# Patient Record
Sex: FEMALE | Race: BLACK OR AFRICAN AMERICAN | Employment: UNEMPLOYED | URBAN - METROPOLITAN AREA
[De-identification: names, ages, dates, MRNs, and addresses within clinical notes are randomized per-mention and may not be internally consistent; named-entity substitution may affect disease eponyms.]

---

## 2021-04-16 ENCOUNTER — HOSPITAL ENCOUNTER (EMERGENCY)
Age: 31
Discharge: HOME | End: 2021-04-16
Attending: EMERGENCY MEDICINE
Payer: MEDICAID

## 2021-04-16 ENCOUNTER — HOSPITAL ENCOUNTER (EMERGENCY)
Dept: ULTRASOUND IMAGING | Age: 31
Discharge: HOME | End: 2021-04-16
Attending: PHYSICIAN ASSISTANT
Payer: MEDICAID

## 2021-04-16 ENCOUNTER — APPOINTMENT (OUTPATIENT)
Dept: CT IMAGING | Age: 31
End: 2021-04-16
Attending: PHYSICIAN ASSISTANT
Payer: MEDICAID

## 2021-04-16 VITALS
HEART RATE: 90 BPM | DIASTOLIC BLOOD PRESSURE: 75 MMHG | TEMPERATURE: 98.7 F | BODY MASS INDEX: 23.74 KG/M2 | SYSTOLIC BLOOD PRESSURE: 122 MMHG | RESPIRATION RATE: 16 BRPM | OXYGEN SATURATION: 96 % | HEIGHT: 63 IN | WEIGHT: 134 LBS

## 2021-04-16 DIAGNOSIS — K76.0 HEPATIC STEATOSIS: ICD-10-CM

## 2021-04-16 DIAGNOSIS — E87.6 HYPOKALEMIA: ICD-10-CM

## 2021-04-16 DIAGNOSIS — N39.0 URINARY TRACT INFECTION WITH HEMATURIA, SITE UNSPECIFIED: ICD-10-CM

## 2021-04-16 DIAGNOSIS — K86.89 PANCREATIC MASS: Primary | ICD-10-CM

## 2021-04-16 DIAGNOSIS — R31.9 URINARY TRACT INFECTION WITH HEMATURIA, SITE UNSPECIFIED: ICD-10-CM

## 2021-04-16 DIAGNOSIS — E87.1 HYPONATREMIA: ICD-10-CM

## 2021-04-16 LAB
ALBUMIN SERPL-MCNC: 3.4 G/DL (ref 3.4–5)
ALBUMIN/GLOB SERPL: 0.8 {RATIO} (ref 0.8–1.7)
ALP SERPL-CCNC: 364 U/L (ref 45–117)
ALT SERPL-CCNC: 78 U/L (ref 13–56)
AMMONIA PLAS-SCNC: 32 UMOL/L (ref 11–32)
AMPHET UR QL SCN: NEGATIVE
ANION GAP SERPL CALC-SCNC: 10 MMOL/L (ref 3–18)
ANION GAP SERPL CALC-SCNC: 7 MMOL/L (ref 3–18)
APAP SERPL-MCNC: <2 UG/ML (ref 10–30)
APPEARANCE UR: ABNORMAL
APTT PPP: 33.1 SEC (ref 23–36.4)
AST SERPL-CCNC: 172 U/L (ref 10–38)
BACTERIA URNS QL MICRO: ABNORMAL /HPF
BARBITURATES UR QL SCN: NEGATIVE
BASOPHILS # BLD: 0 K/UL (ref 0–0.1)
BASOPHILS NFR BLD: 0 % (ref 0–2)
BENZODIAZ UR QL: NEGATIVE
BILIRUB SERPL-MCNC: 5.4 MG/DL (ref 0.2–1)
BILIRUB UR QL: ABNORMAL
BUN SERPL-MCNC: 5 MG/DL (ref 7–18)
BUN SERPL-MCNC: 6 MG/DL (ref 7–18)
BUN/CREAT SERPL: 11 (ref 12–20)
BUN/CREAT SERPL: 12 (ref 12–20)
CALCIUM SERPL-MCNC: 7.5 MG/DL (ref 8.5–10.1)
CALCIUM SERPL-MCNC: 8.6 MG/DL (ref 8.5–10.1)
CANNABINOIDS UR QL SCN: NEGATIVE
CAOX CRY URNS QL MICRO: ABNORMAL
CHLORIDE SERPL-SCNC: 87 MMOL/L (ref 100–111)
CHLORIDE SERPL-SCNC: 96 MMOL/L (ref 100–111)
CK MB CFR SERPL CALC: NORMAL % (ref 0–4)
CK MB SERPL-MCNC: <1 NG/ML (ref 5–25)
CK SERPL-CCNC: 56 U/L (ref 26–192)
CO2 SERPL-SCNC: 29 MMOL/L (ref 21–32)
CO2 SERPL-SCNC: 32 MMOL/L (ref 21–32)
COCAINE UR QL SCN: NEGATIVE
COLOR UR: ABNORMAL
CREAT SERPL-MCNC: 0.45 MG/DL (ref 0.6–1.3)
CREAT SERPL-MCNC: 0.51 MG/DL (ref 0.6–1.3)
DIFFERENTIAL METHOD BLD: ABNORMAL
EOSINOPHIL # BLD: 0 K/UL (ref 0–0.4)
EOSINOPHIL NFR BLD: 0 % (ref 0–5)
EPITH CASTS URNS QL MICRO: ABNORMAL /LPF (ref 0–5)
ERYTHROCYTE [DISTWIDTH] IN BLOOD BY AUTOMATED COUNT: 12.7 % (ref 11.6–14.5)
ETHANOL SERPL-MCNC: <3 MG/DL (ref 0–3)
GLOBULIN SER CALC-MCNC: 4.1 G/DL (ref 2–4)
GLUCOSE SERPL-MCNC: 100 MG/DL (ref 74–99)
GLUCOSE SERPL-MCNC: 113 MG/DL (ref 74–99)
GLUCOSE UR STRIP.AUTO-MCNC: NEGATIVE MG/DL
HCG UR QL: NEGATIVE
HCT VFR BLD AUTO: 38.1 % (ref 35–45)
HDSCOM,HDSCOM: NORMAL
HGB BLD-MCNC: 13.3 G/DL (ref 12–16)
HGB UR QL STRIP: ABNORMAL
INR PPP: 1.1 (ref 0.8–1.2)
KETONES UR QL STRIP.AUTO: >160 MG/DL
LEUKOCYTE ESTERASE UR QL STRIP.AUTO: ABNORMAL
LIPASE SERPL-CCNC: 88 U/L (ref 73–393)
LYMPHOCYTES # BLD: 0.7 K/UL (ref 0.9–3.6)
LYMPHOCYTES NFR BLD: 7 % (ref 21–52)
MAGNESIUM SERPL-MCNC: 1.6 MG/DL (ref 1.6–2.6)
MCH RBC QN AUTO: 35.4 PG (ref 24–34)
MCHC RBC AUTO-ENTMCNC: 34.9 G/DL (ref 31–37)
MCV RBC AUTO: 101.3 FL (ref 74–97)
METHADONE UR QL: NEGATIVE
MONOCYTES # BLD: 0.8 K/UL (ref 0.05–1.2)
MONOCYTES NFR BLD: 9 % (ref 3–10)
MUCOUS THREADS URNS QL MICRO: ABNORMAL /LPF
NEUTS SEG # BLD: 7.7 K/UL (ref 1.8–8)
NEUTS SEG NFR BLD: 83 % (ref 40–73)
NITRITE UR QL STRIP.AUTO: POSITIVE
OPIATES UR QL: NEGATIVE
PCP UR QL: NEGATIVE
PH UR STRIP: 6 [PH] (ref 5–8)
PLATELET # BLD AUTO: 70 K/UL (ref 135–420)
PMV BLD AUTO: 11.3 FL (ref 9.2–11.8)
POTASSIUM SERPL-SCNC: 2.5 MMOL/L (ref 3.5–5.5)
POTASSIUM SERPL-SCNC: 3 MMOL/L (ref 3.5–5.5)
PROT SERPL-MCNC: 7.5 G/DL (ref 6.4–8.2)
PROT UR STRIP-MCNC: 30 MG/DL
PROTHROMBIN TIME: 14 SEC (ref 11.5–15.2)
RBC # BLD AUTO: 3.76 M/UL (ref 4.2–5.3)
RBC #/AREA URNS HPF: ABNORMAL /HPF (ref 0–5)
SODIUM SERPL-SCNC: 129 MMOL/L (ref 136–145)
SODIUM SERPL-SCNC: 132 MMOL/L (ref 136–145)
SP GR UR REFRACTOMETRY: 1.02 (ref 1–1.03)
TROPONIN I SERPL-MCNC: <0.02 NG/ML (ref 0–0.04)
UROBILINOGEN UR QL STRIP.AUTO: 2 EU/DL (ref 0.2–1)
WBC # BLD AUTO: 9.2 K/UL (ref 4.6–13.2)
WBC URNS QL MICRO: ABNORMAL /HPF (ref 0–5)

## 2021-04-16 PROCEDURE — 96365 THER/PROPH/DIAG IV INF INIT: CPT

## 2021-04-16 PROCEDURE — 80143 DRUG ASSAY ACETAMINOPHEN: CPT

## 2021-04-16 PROCEDURE — 80307 DRUG TEST PRSMV CHEM ANLYZR: CPT

## 2021-04-16 PROCEDURE — 96367 TX/PROPH/DG ADDL SEQ IV INF: CPT

## 2021-04-16 PROCEDURE — 85610 PROTHROMBIN TIME: CPT

## 2021-04-16 PROCEDURE — 83690 ASSAY OF LIPASE: CPT

## 2021-04-16 PROCEDURE — 81001 URINALYSIS AUTO W/SCOPE: CPT

## 2021-04-16 PROCEDURE — 96375 TX/PRO/DX INJ NEW DRUG ADDON: CPT

## 2021-04-16 PROCEDURE — 74177 CT ABD & PELVIS W/CONTRAST: CPT

## 2021-04-16 PROCEDURE — 81025 URINE PREGNANCY TEST: CPT

## 2021-04-16 PROCEDURE — 87086 URINE CULTURE/COLONY COUNT: CPT

## 2021-04-16 PROCEDURE — 74011250636 HC RX REV CODE- 250/636: Performed by: PHYSICIAN ASSISTANT

## 2021-04-16 PROCEDURE — 96366 THER/PROPH/DIAG IV INF ADDON: CPT

## 2021-04-16 PROCEDURE — 85730 THROMBOPLASTIN TIME PARTIAL: CPT

## 2021-04-16 PROCEDURE — 93005 ELECTROCARDIOGRAM TRACING: CPT

## 2021-04-16 PROCEDURE — 82553 CREATINE MB FRACTION: CPT

## 2021-04-16 PROCEDURE — 82077 ASSAY SPEC XCP UR&BREATH IA: CPT

## 2021-04-16 PROCEDURE — 74011000250 HC RX REV CODE- 250: Performed by: PHYSICIAN ASSISTANT

## 2021-04-16 PROCEDURE — 96368 THER/DIAG CONCURRENT INF: CPT

## 2021-04-16 PROCEDURE — 85025 COMPLETE CBC W/AUTO DIFF WBC: CPT

## 2021-04-16 PROCEDURE — 99285 EMERGENCY DEPT VISIT HI MDM: CPT

## 2021-04-16 PROCEDURE — 74011000636 HC RX REV CODE- 636: Performed by: EMERGENCY MEDICINE

## 2021-04-16 PROCEDURE — 87186 SC STD MICRODIL/AGAR DIL: CPT

## 2021-04-16 PROCEDURE — 76705 ECHO EXAM OF ABDOMEN: CPT

## 2021-04-16 PROCEDURE — 80053 COMPREHEN METABOLIC PANEL: CPT

## 2021-04-16 PROCEDURE — 82140 ASSAY OF AMMONIA: CPT

## 2021-04-16 PROCEDURE — 87077 CULTURE AEROBIC IDENTIFY: CPT

## 2021-04-16 PROCEDURE — 83735 ASSAY OF MAGNESIUM: CPT

## 2021-04-16 PROCEDURE — 74011250637 HC RX REV CODE- 250/637: Performed by: PHYSICIAN ASSISTANT

## 2021-04-16 RX ORDER — POTASSIUM CHLORIDE 7.45 MG/ML
10 INJECTION INTRAVENOUS
Status: COMPLETED | OUTPATIENT
Start: 2021-04-16 | End: 2021-04-16

## 2021-04-16 RX ORDER — IBUPROFEN 200 MG
1 TABLET ORAL DAILY
Status: DISCONTINUED | OUTPATIENT
Start: 2021-04-17 | End: 2021-04-17 | Stop reason: HOSPADM

## 2021-04-16 RX ORDER — MORPHINE SULFATE 4 MG/ML
4 INJECTION INTRAVENOUS
Status: DISCONTINUED | OUTPATIENT
Start: 2021-04-16 | End: 2021-04-16

## 2021-04-16 RX ORDER — OMEPRAZOLE 40 MG/1
40 CAPSULE, DELAYED RELEASE ORAL DAILY
Qty: 30 CAP | Refills: 0 | Status: SHIPPED | OUTPATIENT
Start: 2021-04-16

## 2021-04-16 RX ORDER — OXYCODONE HYDROCHLORIDE 5 MG/1
10 TABLET ORAL
Status: COMPLETED | OUTPATIENT
Start: 2021-04-16 | End: 2021-04-16

## 2021-04-16 RX ORDER — CEPHALEXIN 250 MG/1
250 CAPSULE ORAL 4 TIMES DAILY
Qty: 28 CAP | Refills: 0 | Status: SHIPPED | OUTPATIENT
Start: 2021-04-16 | End: 2021-04-23

## 2021-04-16 RX ORDER — ONDANSETRON 2 MG/ML
4 INJECTION INTRAMUSCULAR; INTRAVENOUS
Status: COMPLETED | OUTPATIENT
Start: 2021-04-16 | End: 2021-04-16

## 2021-04-16 RX ORDER — KETOROLAC TROMETHAMINE 30 MG/ML
30 INJECTION, SOLUTION INTRAMUSCULAR; INTRAVENOUS
Status: COMPLETED | OUTPATIENT
Start: 2021-04-16 | End: 2021-04-16

## 2021-04-16 RX ORDER — PANTOPRAZOLE SODIUM 40 MG/10ML
40 INJECTION, POWDER, LYOPHILIZED, FOR SOLUTION INTRAVENOUS
Status: DISCONTINUED | OUTPATIENT
Start: 2021-04-16 | End: 2021-04-17 | Stop reason: HOSPADM

## 2021-04-16 RX ORDER — LORAZEPAM 2 MG/ML
1 INJECTION INTRAMUSCULAR ONCE
Status: COMPLETED | OUTPATIENT
Start: 2021-04-16 | End: 2021-04-16

## 2021-04-16 RX ADMIN — POTASSIUM CHLORIDE 10 MEQ: 7.46 INJECTION, SOLUTION INTRAVENOUS at 19:09

## 2021-04-16 RX ADMIN — POTASSIUM CHLORIDE 10 MEQ: 7.46 INJECTION, SOLUTION INTRAVENOUS at 17:52

## 2021-04-16 RX ADMIN — OXYCODONE 10 MG: 5 TABLET ORAL at 17:24

## 2021-04-16 RX ADMIN — IOPAMIDOL 100 ML: 612 INJECTION, SOLUTION INTRAVENOUS at 17:28

## 2021-04-16 RX ADMIN — POTASSIUM BICARBONATE 40 MEQ: 782 TABLET, EFFERVESCENT ORAL at 20:04

## 2021-04-16 RX ADMIN — CEFTRIAXONE 1 G: 1 INJECTION, POWDER, FOR SOLUTION INTRAMUSCULAR; INTRAVENOUS at 20:02

## 2021-04-16 RX ADMIN — KETOROLAC TROMETHAMINE 30 MG: 30 INJECTION, SOLUTION INTRAMUSCULAR at 18:52

## 2021-04-16 RX ADMIN — FOLIC ACID: 5 INJECTION, SOLUTION INTRAMUSCULAR; INTRAVENOUS; SUBCUTANEOUS at 18:37

## 2021-04-16 RX ADMIN — LORAZEPAM 1 MG: 2 INJECTION INTRAMUSCULAR; INTRAVENOUS at 20:02

## 2021-04-16 RX ADMIN — SODIUM CHLORIDE 1000 ML: 900 INJECTION, SOLUTION INTRAVENOUS at 17:52

## 2021-04-16 RX ADMIN — ONDANSETRON 4 MG: 2 INJECTION INTRAMUSCULAR; INTRAVENOUS at 17:16

## 2021-04-16 NOTE — ED TRIAGE NOTES
Patient with mid abd pain that started x3 days ago  PMHx pancreatis and cyst on pancreas    Endorses n/v/d

## 2021-04-16 NOTE — ED NOTES
Report received from [facility], [address] [facility]. Assumed care. Patient resting on stretcher. Requesting to use bathroom. Patient disconnected from monitor and IV meds and able to ambulate with steady gait to bathroom. NAD at this time. Monitoring replaced on patient upon arrival to bed and IV medications infusing. Call bell in reach. Will continue to monitor. [address] [facility] Sera RASHID at bedside to discuss results and plan of care. Patient anxious and requesting medications for anxiety at this time. PA made aware. Ice chips provided to patient as requested. 2005 - Patient noted with to be hallucination and with bizarre requests. Patient appears intoxicated at this time. Fredo RASHID made aware and at bedside. Reports noted changed in mental status. 2100 - Patient requesting to go to bathroom. This RN assisted and monitoring patient while in bathroom. Patient talking to people that aren't in room stating \"Dad what is the baby's name? \" While there is no male in the room. Fredo RASHID aware. 2120 - Bed alarm going off. This RN and Fredo RASHID at bedside. Patient states \"I was trying to get to my purse. \" Patient reoriented and resting in bed.    2145 - Bed alarm going off. This RN to bedside. Patient stating \"I'm trying to talk to my aunt right there. \" Nobody noted to be around. Patient instructed back to bed. States \"I'm not staying here so I don't know why you're telling me to get back in bed. \" Fredo RASHID made aware. 2152 - Patient provided this RN phone number for Tiffanie Porras (mom) [phone]. Patient up oob stating that she is leaving. She spoke with her boyfriend and states he will be here in 10 minutes. Patient is alert and oriented but refusing to stay. Benjamin 27 (boyfriend) at bedside. Patient is alert and oriented. Denies si/hi. Reports she is aware of bizarre behavior. Fredo RASHID speaking to patient and boyfriend. IV locks removed x2 per request of patient.

## 2021-04-16 NOTE — ED PROVIDER NOTES
EMERGENCY DEPARTMENT HISTORY AND PHYSICAL EXAM    Date: 4/16/2021  Patient Name: Ervin Amezcua    History of Presenting Illness     Chief Complaint   Patient presents with    Abdominal Pain         History Provided By: Patient    Chief Complaint: abd pain       Additional History (Context):   4:04 PM  Ervin Amezcua is a 32 y.o. female with PMHX pancreatitis presents to the emergency department C/O upper abdominal pain for the past 3 days with nausea and vomiting. No diarrhea. Patient states this does feel like past pancreatitis flare she has had. She does admit to daily alcohol use. Denies any abdominal surgeries in the past.  She is currently on her menstrual cycle. Denies any fevers. PCP: None        Past History     Past Medical History:  No past medical history on file. Past Surgical History:  No past surgical history on file. Family History:  No family history on file. Social History:  Social History     Tobacco Use    Smoking status: Not on file   Substance Use Topics    Alcohol use: Not on file    Drug use: Not on file       Allergies: Allergies   Allergen Reactions    Tylenol [Acetaminophen] Nausea Only       Review of Systems   Review of Systems   Constitutional: Negative for chills and fever. Respiratory: Negative for shortness of breath. Cardiovascular: Negative for chest pain. Gastrointestinal: Positive for abdominal pain, nausea and vomiting. Negative for diarrhea. Musculoskeletal: Negative for back pain and neck pain. Neurological: Negative for weakness and numbness. All other systems reviewed and are negative. Physical Exam     Vitals:    04/16/21 1830 04/16/21 2030 04/16/21 2045 04/16/21 2112   BP: 112/80 (!) 129/97 103/83 122/75   Pulse: 71 93 100 90   Resp: 14 16 19 16   Temp:       SpO2: 100% 99% 100% 96%   Weight:       Height:         Physical Exam  Vitals signs and nursing note reviewed.    Constitutional:       Appearance: She is well-developed. Comments: On stretcher nontoxic appears slightly uncomfortable   HENT:      Head: Normocephalic and atraumatic. Eyes:      General: Scleral icterus present. Neck:      Musculoskeletal: Normal range of motion and neck supple. Cardiovascular:      Rate and Rhythm: Normal rate and regular rhythm. Heart sounds: Normal heart sounds. No murmur. Pulmonary:      Effort: Pulmonary effort is normal. No respiratory distress. Breath sounds: Normal breath sounds. No wheezing or rales. Abdominal:      General: Bowel sounds are normal.      Palpations: Abdomen is soft. Tenderness: There is abdominal tenderness in the epigastric area and left upper quadrant. There is no right CVA tenderness or left CVA tenderness. Neurological:      Mental Status: She is alert and oriented to person, place, and time. Psychiatric:         Judgment: Judgment normal.         Diagnostic Study Results     Labs:   No results found for this or any previous visit (from the past 12 hour(s)). Radiologic Studies:   US GALLBLADDER   Final Result      Echogenic liver suggesting hepatic steatosis. Liver is mildly enlarged. There is sludge in the gallbladder without evidence of acute cholecystitis. Biliary tree is normal in caliber. Sonographic Kobe Favia sign could not be assessed   due to medication on board. If there is clinical concern for cystic duct   obstruction then I would advise a hepatobiliary nuclear medicine scan. There is a complex cystic structure in the tail the pancreas measuring 3.5 x 4 x   4.7 cm. The cystic structure seen in the uncinate process is not identified on   the ultrasound likely due to adjacent bowel. Differential diagnoses for the   cystic lesion in the tail the pancreas includes pancreatic pseudocyst versus   solid pseudopapillary neoplasm. Advise MRI for further evaluation. CT ABD PELV W CONT   Final Result      There are 2 cystic masses in the pancreas.  The larger one in the tail the   pancreas demonstrates increased internal density which may represent hemorrhage   or solid component. Differential diagnoses includes solid pseudopapillary   neoplasm(SPN) and pancreatic pseudocyst. Advise dedicated pancreatic MRI for   further evaluation. Severe hepatic steatosis. Gas in the urinary bladder may reflect instrumentation or emphysematous   cystitis. Correlate with urinalysis. Small amount of free fluid in the pelvis likely physiologic. Advise GI consultation. Appendix is not clearly visualized. CT Results  (Last 48 hours)               04/16/21 1736  CT ABD PELV W CONT Final result    Impression:      There are 2 cystic masses in the pancreas. The larger one in the tail the   pancreas demonstrates increased internal density which may represent hemorrhage   or solid component. Differential diagnoses includes solid pseudopapillary   neoplasm(SPN) and pancreatic pseudocyst. Advise dedicated pancreatic MRI for   further evaluation. Severe hepatic steatosis. Gas in the urinary bladder may reflect instrumentation or emphysematous   cystitis. Correlate with urinalysis. Small amount of free fluid in the pelvis likely physiologic. Advise GI consultation. Appendix is not clearly visualized. Narrative:  EXAM: CT of the abdomen and pelvis       INDICATION: Abdominal pain       COMPARISON: None. TECHNIQUE: Axial CT imaging of the abdomen and pelvis was performed with   intravenous contrast. Multiplanar reformats were generated. One or more dose   reduction techniques were used on this CT: automated exposure control,   adjustment of the mAs and/or kVp according to patient size, and iterative   reconstruction techniques. The specific techniques used on this CT exam have   been documented in the patient's electronic medical record.  Digital Imaging and   Communications in Medicine (DICOM) format image data are available to   nonaffiliated external healthcare facilities or entities on a secure, media   free, reciprocally searchable basis with patient authorization for at least a   12-month period after this study. _______________       FINDINGS:       LOWER CHEST: Unremarkable. LIVER, BILIARY: There is moderate to severe hepatic steatosis. No focal hepatic   lesion seen. No biliary dilation. Gallbladder is unremarkable. PANCREAS: There is 2 cystic masses in the pancreas. There is a 14 mm cystic mass   in the uncinate process of the pancreas and there is a 4.3 x 4.5 cm cystic mass   in the tail the pancreas. There appears to be increased density within this   cystic mass which may represent hemorrhage or solid components. Advise MRI of   the pancreas for further evaluation. There is no pancreatic duct dilatation or   atrophy. SPLEEN: Normal.       ADRENALS: Normal.       KIDNEYS: Unremarkable       VASCULATURE: Unremarkable       LYMPH NODES: No enlarged lymph nodes. GASTROINTESTINAL TRACT: No bowel dilation or wall thickening. Appendix is not   clearly visualized. There is no bowel obstruction. PELVIC ORGANS: Small amount of free fluid is present in the pelvis. The uterus   is retroverted. Urinary bladder is under distended. There is small amount of gas   in the urinary bladder which may reflect instrumentation or emphysematous   cystitis. Mild pericystic stranding is seen. Correlate with urinalysis to   cholecystitis. There is a tampon in the vaginal vault. BONES: No acute or aggressive osseous abnormalities identified. Minimal   retrolisthesis present at L5-S1 by 4 mm secondary to degenerative disc disease. OTHER: None.       _______________               CXR Results  (Last 48 hours)    None          Medical Decision Making   I am the first provider for this patient.     I reviewed the vital signs, available nursing notes, past medical history, past surgical history, family history and social history. Vital Signs: Reviewed the patient's vital signs. Pulse Oximetry Analysis: 99% on RA     EKG interpretation: (Preliminary)  4:04 PM   Normal sinus rhythm rate 69 bpm T wave inversions V2 to V6 no STEMI  EKG read by Unique Urban PA-C  At 1700     Records Reviewed: Nursing Notes and Old Medical Records    Procedures:  Procedures    ED Course:   4:04 PM Initial assessment performed. The patients presenting problems have been discussed, and they are in agreement with the care plan formulated and outlined with them. I have encouraged them to ask questions as they arise throughout their visit. Patient became very anxious after being informed of CT results. Patient was given 1 mg IV Ativan. Shortly after patient began acting confused repeatedly asking for a sandwich and began talking to people that were not in the room    Case discussed with Dr. Nikhil Bianchi recommended patient stop drinking IV hydration full liquid diet PPI. Patient would need to follow-up with GI as an outpatient as MRI at this point in time would not be helpful with generalized pancreatitis    Patient requesting a sandwich repetitively. She has made several statements that do not quite make sense but she is alert and oriented to person place and time. Patient's friend arrived in the ED and is willing to give her a ride home. Patient is able to ambulate with steady gait. Recommended admission however patient refusing see informed refusal note    I informed the pt that She needed admission for adequate evaluation for her symptoms, diagnosis, that by refusing admission She is at risk for liver failure, worsening pancreatitis, worsening electrolyte imbalance, cardiac arrhythmia. She is awake, alert, She understands her condition and the risks involved in leaving.  While the patient has received oxycodone and Ativan, it has been rule hours since last receiving this medication when having this conversation and is clinically aware of their surroundings and able to ask appropriate questions, the patients friend and the nurse present confirms She is not clinically intoxicated and able to make medical decisions. She verbalized knowing the same risks and understood it was recommended that She stay and could also return at any time. her friend was present for the discussion and also verbalized that She understood both diagnosis, risks and could return at any time. They were both provided with warnings regarding worsening of her condition and were provided instructions to follow up with Dr. Mary Gutierrez and Dr. Audrye Garg tomorrow or return to the Emergency Room as soon as possible. This discussion was witnessed by [facility], RN. Discussion:  Pt presents with abdominal pain vomiting jaundice. She has a history of alcohol abuse. She does have known pancreatic cysts that were seen on CT again today. Patient will require follow-up for MRI to further characterize the cysts and rule out neoplasm. She also has severe hepatic steatosis secondary to alcohol abuse with elevated LFTs. She also was found to have elevated bilirubin but ultrasound shows no findings of cholecystitis. She is also hypokalemic with some findings of EKG changes. Also hyponatremic. These levels did improve with IV and oral replacement however continued to be low. Ammonia was normal.  Patient did have some confusion after receiving Ativan and was requesting discharge. Patient was alert and oriented to person place and time and had a ride home that was willing to take care of her. . Strict return precautions given, pt offering no questions or complaints. Patient returned call the next day. The results and discharge plan were explained to her at length. She does have the discharge papers that she was sent home with and does agree to follow-up as directed.     Diagnosis and Disposition     DISCHARGE NOTE:  Will Isidro  results have been reviewed with her.  She has been counseled regarding her diagnosis, treatment, and plan. She verbally conveys understanding and agreement of the signs, symptoms, diagnosis, treatment and prognosis and additionally agrees to follow up as discussed. She also agrees with the care-plan and conveys that all of her questions have been answered. I have also provided discharge instructions for her that include: educational information regarding their diagnosis and treatment, and list of reasons why they would want to return to the ED prior to their follow-up appointment, should her condition change. She has been provided with education for proper emergency department utilization. CLINICAL IMPRESSION:    1. Pancreatic mass    2. Hypokalemia    3. Hyponatremia    4. Urinary tract infection with hematuria, site unspecified    5. Hepatic steatosis        PLAN:  1. D/C Home  2. Discharge Medication List as of 4/16/2021 10:46 PM      START taking these medications    Details   cephALEXin (Keflex) 250 mg capsule Take 1 Cap by mouth four (4) times daily for 7 days. , Normal, Disp-28 Cap, R-0      omeprazole (PRILOSEC) 40 mg capsule Take 1 Cap by mouth daily. , Normal, Disp-30 Cap, R-0           3.    Follow-up Information     Follow up With Specialties Details Why Contact Info    Ramu Velez MD Gastroenterology Schedule an appointment as soon as possible for a visit  For MRI to further characterize pancreas mass [address] [place] [facility] [address] [phone]      Kelsey Olivares MD Internal Medicine Schedule an appointment as soon as possible for a visit  Follow-up for further evaluation of liver disease [address] [facility] [phone]      [facility] EMERGENCY DEPT Emergency Medicine Schedule an appointment as soon as possible for a visit   [address] [place] [address] [phone]                 Please note that this dictation was completed with Jonathan the computer voice recognition software. Quite often unanticipated grammatical, syntax, homophones, and other interpretive errors are inadvertently transcribed by the computer software. Please disregard these errors. Please excuse any errors that have escaped final proofreading.

## 2021-04-17 LAB
ATRIAL RATE: 69 BPM
CALCULATED P AXIS, ECG09: 29 DEGREES
CALCULATED R AXIS, ECG10: 14 DEGREES
CALCULATED T AXIS, ECG11: -18 DEGREES
DIAGNOSIS, 93000: NORMAL
P-R INTERVAL, ECG05: 156 MS
Q-T INTERVAL, ECG07: 410 MS
QRS DURATION, ECG06: 98 MS
QTC CALCULATION (BEZET), ECG08: 439 MS
VENTRICULAR RATE, ECG03: 69 BPM

## 2021-04-17 NOTE — ED NOTES
Discharge instructions provided to patient and bf. Verbalized understanding. Patient alert and oriented. Ambulating with steady gait.  Ambulated with steady gait out of ED with bf.

## 2021-04-17 NOTE — DISCHARGE INSTRUCTIONS
Follow full liquid diet for 3 days  No alcohol at all  Drink plenty of fluids  Do not take any Tylenol  Take antibiotic for UTI and omeprazole

## 2021-04-19 LAB
BACTERIA SPEC CULT: ABNORMAL
CC UR VC: ABNORMAL
SERVICE CMNT-IMP: ABNORMAL

## 2021-04-19 NOTE — PROGRESS NOTES
Urine culture reviewed. Patient prescribed Keflex at time of ER visit. E. coli susceptible to cephalosporins. No change needed based on culture.

## 2022-07-06 PROBLEM — Z00.00 ENCOUNTER FOR PREVENTIVE HEALTH EXAMINATION: Status: ACTIVE | Noted: 2022-07-06
